# Patient Record
Sex: FEMALE | Race: WHITE
[De-identification: names, ages, dates, MRNs, and addresses within clinical notes are randomized per-mention and may not be internally consistent; named-entity substitution may affect disease eponyms.]

---

## 2020-11-10 ENCOUNTER — HOSPITAL ENCOUNTER (EMERGENCY)
Dept: HOSPITAL 63 - ER | Age: 60
Discharge: HOME | End: 2020-11-10
Payer: COMMERCIAL

## 2020-11-10 VITALS
DIASTOLIC BLOOD PRESSURE: 87 MMHG | SYSTOLIC BLOOD PRESSURE: 130 MMHG | SYSTOLIC BLOOD PRESSURE: 130 MMHG | SYSTOLIC BLOOD PRESSURE: 130 MMHG | DIASTOLIC BLOOD PRESSURE: 87 MMHG | DIASTOLIC BLOOD PRESSURE: 87 MMHG

## 2020-11-10 VITALS — BODY MASS INDEX: 42.36 KG/M2 | WEIGHT: 242.07 LBS | HEIGHT: 63.5 IN

## 2020-11-10 DIAGNOSIS — S76.012A: ICD-10-CM
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
## 2020-11-10 DIAGNOSIS — S29.011A: Primary | ICD-10-CM

## 2020-11-10 DIAGNOSIS — Z88.2: ICD-10-CM
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
## 2020-11-10 PROCEDURE — 99284 EMERGENCY DEPT VISIT MOD MDM: CPT

## 2020-11-10 PROCEDURE — 71045 X-RAY EXAM CHEST 1 VIEW: CPT

## 2020-11-10 PROCEDURE — 72170 X-RAY EXAM OF PELVIS: CPT
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
## 2021-07-09 ENCOUNTER — HOSPITAL ENCOUNTER (OUTPATIENT)
Dept: HOSPITAL 63 - MAMMO | Age: 61
End: 2021-07-09
Attending: PHYSICIAN ASSISTANT
Payer: COMMERCIAL

## 2021-07-09 DIAGNOSIS — Z12.31: Primary | ICD-10-CM

## 2021-07-09 PROCEDURE — 77067 SCR MAMMO BI INCL CAD: CPT

## 2021-07-21 NOTE — RAD
DATE: 7/9/2021



EXAM: DIGITAL SCREEN BILAT W/CAD



HISTORY: Screening



COMPARISON: 7/30/2013



This study was interpreted with the benefit of Computerized Aided Detection

(CAD).





Breast Density: SCATTERED The breast parenchyma shows scattered fibroglandular

densities. Breast parenchyma level B.





FINDINGS: There is a group of calcifications in the upper outer left breast at

2:00, 8.5 cm posterior to the nipple. No suspicious mass or architectural

distortion in either breast.





IMPRESSION: Indeterminate calcifications in the upper outer left breast.

Recommend CC and ML magnification views to further evaluate.







BI-RADS CATEGORY: 0 INCOMPLETE: NEEDS ADDITIONAL IMAGING EVALUATION AND/OR

PRIOR MAMMOGRAMS FOR COMPARISON.



RECOMMENDED FOLLOW-UP: ADD ADDITIONAL IMAGING



PQRS compliance statement: Patient information was entered into a reminder

system with a target due date for the next mammogram.



Mammography is a sensitive method for finding small breast cancers, but it

does not detect them all and is not a substitute for careful clinical

examination.  A negative mammogram does not negate a clinically suspicious

finding and should not result in delay in biopsying a clinically suspicious

abnormality.



"Our facility is accredited by the [employer] Mammography

Program."

## 2021-08-05 ENCOUNTER — HOSPITAL ENCOUNTER (OUTPATIENT)
Dept: HOSPITAL 63 - MAMMO | Age: 61
End: 2021-08-05
Attending: PHYSICIAN ASSISTANT
Payer: COMMERCIAL

## 2021-08-05 DIAGNOSIS — R92.0: Primary | ICD-10-CM

## 2021-08-05 PROCEDURE — 77065 DX MAMMO INCL CAD UNI: CPT

## 2021-08-05 NOTE — RAD
EXAM: Left breast diagnostic mammogram.



HISTORY: 61-year-old female presents for evaluation of left breast calcifications demonstrated on a m
ammogram dated 7/9/2021.



TECHNIQUE: Full-field true lateral and spot mag indication views of the left breast are obtained.



COMPARISON: 7/9/2021 and 8/1/2013



BREAST PARENCHYMAL DENSITY: Level B - Scattered fibroglandular densities



FINDINGS: There is a cluster of heterogeneous coarse microcalcifications within the 3:00 position of 
the left breast centered approximately 10 cm from the nipple. The size and density of several these c
alcifications favors benignity. However, there are additional heterogeneous calcifications in this cl
uster which remain indeterminant morphologically. There is faint nodular asymmetry in this location. 
No convincing mass or distortion is seen. There is an additional cluster of more round coarse benign 
calcifications within the 6:00 position at mid to posterior depth. There are few additional scattered
 benign calcifications.



IMPRESSION: 

1. Cluster of microcalcifications within the posterior 3:00 position of the left breast. Although the
 morphology of the largest calcifications favors benignity, the smaller calcifications remain indeter
minant. Given the significant change compared to the prior study, stereotactic guided biopsy is recom
mended for definitive diagnosis.

2. BI-RADS Category 4: Suspicious abnormality. Biopsy is recommended.



These findings and recommendations were discussed with the patient and communicated to the referring 
physician office nursing at 1550 hours on 8/5/2021.



If your mammogram demonstrates that you have dense breast tissue, which could hide abnormalities, and
 if you have other risk factors for breast cancer that have been identified, you might benefit from s
upplemental screening tests that may be suggested by your ordering physician.  Dense breast tissue, i
n and of itself, is a relatively common condition.  This information is not provided to cause undue c
oncern, but rather to raise your awareness and to promote discussion with your physician regarding th
e presence of other risk factors, in addition to dense breast tissue. A report of your mammography re
sults will be sent to you and your physician.  You should contact your physician if you have any ques
tions or concerns regarding this report.  



Mammography is a sensitive method for finding small breast cancers, but it does not detect them all a
nd is not a substitute for careful clinical examination.  A negative mammogram does not negate a clin
ically suspicious finding and should not result in delay in biopsying a clinically suspicious abnorma
lity.



PQRS compliance statement -  Patient information was entered into a reminder system with a target due
 date for the next mammogram. 



"Our facility is accredited by the [employer] Mammography Program."



Electronically signed by: Noris Judd MD (8/5/2021 3:42 PM) [id number]